# Patient Record
Sex: FEMALE | Race: ASIAN | NOT HISPANIC OR LATINO | URBAN - METROPOLITAN AREA
[De-identification: names, ages, dates, MRNs, and addresses within clinical notes are randomized per-mention and may not be internally consistent; named-entity substitution may affect disease eponyms.]

---

## 2020-01-01 ENCOUNTER — EMERGENCY (EMERGENCY)
Facility: HOSPITAL | Age: 85
LOS: 1 days | End: 2020-01-01
Attending: EMERGENCY MEDICINE
Payer: MEDICARE

## 2020-01-01 PROCEDURE — 99291 CRITICAL CARE FIRST HOUR: CPT

## 2020-06-05 NOTE — ED ADULT NURSE NOTE - CHIEF COMPLAINT QUOTE
Patient choked on cream of wheat cereal, ems dispatched, upon arrival patient not breathing and pulseless. Patient intubated cpr began. 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

## 2020-06-05 NOTE — ED ADULT NURSE NOTE - NSIMPLEMENTINTERV_GEN_ALL_ED
Implemented All Fall Risk Interventions:  Cook[place] to call system. Call bell, personal items and telephone within reach. Instruct patient to call for assistance. Room bathroom lighting operational. Non-slip footwear when patient is off stretcher. Physically safe environment: no spills, clutter or unnecessary equipment. Stretcher in lowest position, wheels locked, appropriate side rails in place. Provide visual cue, wrist band, yellow gown, etc. Monitor gait and stability. Monitor for mental status changes and reorient to person, place, and time. Review medications for side effects contributing to fall risk. Reinforce activity limits and safety measures with patient and family.

## 2020-06-05 NOTE — ED PROVIDER NOTE - CLINICAL SUMMARY MEDICAL DECISION MAKING FREE TEXT BOX
cardiac arrest - down time 1h. given patients age and absence of signs of life via ems resuscitation, the likelihood of meaningful recovery is negligible. more harm than good being done to continue code in er. after exam, and discussion with er team, I called the code at 1202p with no further acls cardiac arrest - down time 1h. given patients age and absence of signs of life via ems resuscitation, the likelihood of meaningful recovery is negligible. more harm than good being done to continue code in er. after exam, and discussion with er team, I called the code at 1202p with no further acls  notified Laci Haywood  grandson notified.

## 2020-06-05 NOTE — ED PROVIDER NOTE - OBJECTIVE STATEMENT
87F hx of htn and cancer pw cardiac arrest. ems call for unresponsiveness 1hr ago. patient had been down 20 minutes. ems arrives and finds patient asystolic. 6 rounds of epi given. no rosc or shockable rhythm   Fh and Sh not otherwise contributory  ROS otherwise negative

## 2020-06-05 NOTE — ED PROVIDER NOTE - PHYSICAL EXAMINATION
gen - unresponsive  skin - cyanotic  cv - no pulse  resp - no spont respirations  gi - minimally distended, soft  msk - no obvious traumas  neuro - no gag  eyes - dilated pupils, 4mm, not reactive
